# Patient Record
Sex: FEMALE | Race: WHITE | NOT HISPANIC OR LATINO
[De-identification: names, ages, dates, MRNs, and addresses within clinical notes are randomized per-mention and may not be internally consistent; named-entity substitution may affect disease eponyms.]

---

## 2017-01-23 ENCOUNTER — APPOINTMENT (OUTPATIENT)
Dept: OBGYN | Facility: CLINIC | Age: 52
End: 2017-01-23

## 2017-01-23 VITALS
DIASTOLIC BLOOD PRESSURE: 80 MMHG | WEIGHT: 245 LBS | HEIGHT: 66 IN | BODY MASS INDEX: 39.37 KG/M2 | SYSTOLIC BLOOD PRESSURE: 129 MMHG

## 2017-01-23 DIAGNOSIS — N94.3 PREMENSTRUAL TENSION SYNDROME: ICD-10-CM

## 2017-01-23 DIAGNOSIS — N92.6 IRREGULAR MENSTRUATION, UNSPECIFIED: ICD-10-CM

## 2017-01-25 ENCOUNTER — TRANSCRIPTION ENCOUNTER (OUTPATIENT)
Age: 52
End: 2017-01-25

## 2017-02-01 ENCOUNTER — APPOINTMENT (OUTPATIENT)
Dept: OBGYN | Facility: CLINIC | Age: 52
End: 2017-02-01

## 2018-02-20 ENCOUNTER — APPOINTMENT (OUTPATIENT)
Dept: OBGYN | Facility: CLINIC | Age: 53
End: 2018-02-20
Payer: COMMERCIAL

## 2018-02-20 VITALS
BODY MASS INDEX: 37.61 KG/M2 | WEIGHT: 234 LBS | HEIGHT: 66 IN | SYSTOLIC BLOOD PRESSURE: 124 MMHG | DIASTOLIC BLOOD PRESSURE: 77 MMHG

## 2018-02-20 DIAGNOSIS — Z12.11 ENCOUNTER FOR SCREENING FOR MALIGNANT NEOPLASM OF COLON: ICD-10-CM

## 2018-02-20 DIAGNOSIS — Z01.419 ENCOUNTER FOR GYNECOLOGICAL EXAMINATION (GENERAL) (ROUTINE) W/OUT ABNORMAL FINDINGS: ICD-10-CM

## 2018-02-20 DIAGNOSIS — Z12.12 ENCOUNTER FOR SCREENING FOR MALIGNANT NEOPLASM OF COLON: ICD-10-CM

## 2018-02-20 PROCEDURE — 82270 OCCULT BLOOD FECES: CPT

## 2018-02-20 PROCEDURE — 99396 PREV VISIT EST AGE 40-64: CPT

## 2018-02-21 ENCOUNTER — MESSAGE (OUTPATIENT)
Age: 53
End: 2018-02-21

## 2018-02-21 LAB — HPV HIGH+LOW RISK DNA PNL CVX: NOT DETECTED

## 2018-02-23 ENCOUNTER — MOBILE ON CALL (OUTPATIENT)
Age: 53
End: 2018-02-23

## 2018-02-23 LAB — CYTOLOGY CVX/VAG DOC THIN PREP: NORMAL

## 2018-04-09 ENCOUNTER — RX RENEWAL (OUTPATIENT)
Age: 53
End: 2018-04-09

## 2019-03-26 ENCOUNTER — TRANSCRIPTION ENCOUNTER (OUTPATIENT)
Age: 54
End: 2019-03-26

## 2019-05-17 ENCOUNTER — APPOINTMENT (OUTPATIENT)
Dept: BARIATRICS | Facility: CLINIC | Age: 54
End: 2019-05-17

## 2019-05-21 ENCOUNTER — APPOINTMENT (OUTPATIENT)
Dept: OBGYN | Facility: CLINIC | Age: 54
End: 2019-05-21
Payer: COMMERCIAL

## 2019-05-21 VITALS
SYSTOLIC BLOOD PRESSURE: 148 MMHG | BODY MASS INDEX: 39.53 KG/M2 | WEIGHT: 246 LBS | HEIGHT: 66 IN | DIASTOLIC BLOOD PRESSURE: 74 MMHG

## 2019-05-21 DIAGNOSIS — Z01.419 ENCOUNTER FOR GYNECOLOGICAL EXAMINATION (GENERAL) (ROUTINE) W/OUT ABNORMAL FINDINGS: ICD-10-CM

## 2019-05-21 PROCEDURE — 99396 PREV VISIT EST AGE 40-64: CPT

## 2019-05-21 NOTE — PHYSICAL EXAM
[Awake] : awake [Alert] : alert [Soft] : soft [Oriented x3] : oriented to person, place, and time [Normal] : uterus [No Bleeding] : there was no active vaginal bleeding [Uterine Adnexae] : were not tender and not enlarged [RRR, No Murmurs] : RRR, no murmurs [CTAB] : CTAB [Acute Distress] : no acute distress [Thyroid Nodule] : no thyroid nodule [Goiter] : no goiter [Mass] : no breast mass [Nipple Discharge] : no nipple discharge [Axillary LAD] : no axillary lymphadenopathy [Tender] : non tender

## 2019-05-24 ENCOUNTER — MESSAGE (OUTPATIENT)
Age: 54
End: 2019-05-24

## 2019-05-24 LAB — HPV HIGH+LOW RISK DNA PNL CVX: NOT DETECTED

## 2019-05-28 ENCOUNTER — MESSAGE (OUTPATIENT)
Age: 54
End: 2019-05-28

## 2019-05-28 LAB — CYTOLOGY CVX/VAG DOC THIN PREP: NORMAL

## 2019-06-03 ENCOUNTER — MESSAGE (OUTPATIENT)
Age: 54
End: 2019-06-03

## 2019-06-03 ENCOUNTER — ASOB RESULT (OUTPATIENT)
Age: 54
End: 2019-06-03

## 2019-06-03 ENCOUNTER — APPOINTMENT (OUTPATIENT)
Dept: OBGYN | Facility: CLINIC | Age: 54
End: 2019-06-03
Payer: COMMERCIAL

## 2019-06-03 PROCEDURE — 76830 TRANSVAGINAL US NON-OB: CPT

## 2022-09-19 ENCOUNTER — NON-APPOINTMENT (OUTPATIENT)
Age: 57
End: 2022-09-19

## 2022-10-03 ENCOUNTER — APPOINTMENT (OUTPATIENT)
Dept: ORTHOPEDIC SURGERY | Facility: CLINIC | Age: 57
End: 2022-10-03
Payer: COMMERCIAL

## 2022-10-03 VITALS
SYSTOLIC BLOOD PRESSURE: 138 MMHG | WEIGHT: 185 LBS | BODY MASS INDEX: 30.82 KG/M2 | HEIGHT: 65 IN | DIASTOLIC BLOOD PRESSURE: 75 MMHG | HEART RATE: 87 BPM

## 2022-10-03 DIAGNOSIS — M51.36 OTHER INTERVERTEBRAL DISC DEGENERATION, LUMBAR REGION: ICD-10-CM

## 2022-10-03 PROCEDURE — 99204 OFFICE O/P NEW MOD 45 MIN: CPT

## 2022-10-03 NOTE — DISCUSSION/SUMMARY
[de-identified] : Lumbar disc degenerative disease.\par Discussed all options.\par Diclofenac.\par She has 3 levels of significant degenerative disc disease and she is not the ideal surgical candidate due to multilevels.\par Referred to Dr. Yahir Perez for pain management, spinal cord stimulator.\par She will also discuss this with her primary pain management specialist.\par All options discussed including rest, medicine, home exercise, acupuncture, Chiropractic care, Physical Therapy, Pain management, and last resort surgery. All questions were answered, all alternatives discussed and the patient is in complete agreement with that plan. Follow-up appointment as instructed. Any issues and the patient will call or come in sooner.\par

## 2022-10-03 NOTE — ADDENDUM
[FreeTextEntry1] : This note was written by Eduardo Jones on 10/03/2022 acting as scribe for Dr. Joseph Ceballos M.D.\par \par I, Joseph Ceballos MD, have read and attest that all the information, medical decision making and discharge instructions within are true and accurate.

## 2022-10-03 NOTE — PHYSICAL EXAM
[Normal] : Gait: normal [Alarcon's Sign] : negative Alarcon's sign [Pronator Drift] : negative pronator drift [SLR] : negative straight leg raise [de-identified] : 5 out of 5 motor strength, sensation is intact and symmetrical full range of motion flexion extension and rotation, no palpatory tenderness full range of motion of hips knees shoulders and elbows (all four extremities), no atrophy, negative straight leg raise, no pathological reflexes, no swelling, normal ambulation, no apparent distress skin is intact, no palpable lymph nodes, no upper or lower extremity instability, alert and oriented x3 and normal mood. Normal finger-to nose test.  Pain with lumbar flexion. [de-identified] : Lumbar MRI 1/17/2022\par Impression: Degenerative changes throughout lumbar spine\par Changes most prominent at L4-5 L2-3 [id number] with the patient.

## 2022-10-03 NOTE — HISTORY OF PRESENT ILLNESS
[Stable] : stable [de-identified] : 56 year old female presents for initial evaluation of lower back pain.\par Has always had lower back pain. Has gotten progressively worse. \par Hx: neck and back arthritis \par Had a surgical fusion to the neck 2017\par Pt cannot take NSAIDs; she had bariatric surgery; limited options for pain relief \par Pt describes the nature of the pain as sharp and achy depending on activity. Pain is felt more at night during rest\par Changing positions from sitting to standing exacerbates the pain. \par Pain is constant \par Pain radiates from lower back to R buttocks down the lateral aspect of R leg to knee. Numbness and tingling also felt occasionally in this region\par Had an ablation for lower back pain  in June 2022; was not helpful\par Last LESI 2018; did not offer any relief\par Currently sees a pain management doctor; Dr. Coleman \par No fever, chills, sweats, nausea/vomiting. No bowel or bladder dysfunction, no recent weight loss or gain. No night pain. This history is in addition to the intake form that I personally reviewed.\par

## 2022-10-11 ENCOUNTER — APPOINTMENT (OUTPATIENT)
Dept: ORTHOPEDIC SURGERY | Facility: CLINIC | Age: 57
End: 2022-10-11